# Patient Record
Sex: FEMALE | Race: WHITE | NOT HISPANIC OR LATINO | Employment: OTHER | URBAN - METROPOLITAN AREA
[De-identification: names, ages, dates, MRNs, and addresses within clinical notes are randomized per-mention and may not be internally consistent; named-entity substitution may affect disease eponyms.]

---

## 2017-01-24 ENCOUNTER — ALLSCRIPTS OFFICE VISIT (OUTPATIENT)
Dept: OTHER | Facility: OTHER | Age: 37
End: 2017-01-24

## 2017-01-24 ENCOUNTER — GENERIC CONVERSION - ENCOUNTER (OUTPATIENT)
Dept: OTHER | Facility: OTHER | Age: 37
End: 2017-01-24

## 2017-01-24 DIAGNOSIS — M54.9 DORSALGIA: ICD-10-CM

## 2017-01-24 DIAGNOSIS — R05.9 COUGH: ICD-10-CM

## 2017-01-24 DIAGNOSIS — R53.83 OTHER FATIGUE: ICD-10-CM

## 2017-01-24 DIAGNOSIS — R53.81 OTHER MALAISE: ICD-10-CM

## 2017-01-24 LAB
BILIRUB UR QL STRIP: NEGATIVE
CLARITY UR: NORMAL
COLOR UR: YELLOW
GLUCOSE (HISTORICAL): NORMAL
HGB UR QL STRIP.AUTO: NEGATIVE
KETONES UR STRIP-MCNC: NEGATIVE MG/DL
LEUKOCYTE ESTERASE UR QL STRIP: NEGATIVE
NITRITE UR QL STRIP: NEGATIVE
PH UR STRIP.AUTO: 6 [PH]
PROT UR STRIP-MCNC: NEGATIVE MG/DL
SP GR UR STRIP.AUTO: 1.01
UROBILINOGEN UR QL STRIP.AUTO: NORMAL

## 2017-01-25 LAB
CULTURE RESULT (HISTORICAL): NORMAL
MISCELLANEOUS LAB TEST RESULT (HISTORICAL): NO GROWTH

## 2017-01-26 ENCOUNTER — HOSPITAL ENCOUNTER (OUTPATIENT)
Dept: RADIOLOGY | Facility: CLINIC | Age: 37
Discharge: HOME | End: 2017-01-26
Payer: COMMERCIAL

## 2017-01-26 ENCOUNTER — GENERIC CONVERSION - ENCOUNTER (OUTPATIENT)
Dept: OTHER | Facility: OTHER | Age: 37
End: 2017-01-26

## 2017-01-26 DIAGNOSIS — R05.9 COUGH: ICD-10-CM

## 2017-01-26 PROCEDURE — 71020 HB CHEST X-RAY 2VW FRONTAL&LATL: CPT

## 2017-03-17 ENCOUNTER — ALLSCRIPTS OFFICE VISIT (OUTPATIENT)
Dept: OTHER | Facility: OTHER | Age: 37
End: 2017-03-17

## 2017-08-08 ENCOUNTER — GENERIC CONVERSION - ENCOUNTER (OUTPATIENT)
Dept: OTHER | Facility: OTHER | Age: 37
End: 2017-08-08

## 2017-08-08 ENCOUNTER — ALLSCRIPTS OFFICE VISIT (OUTPATIENT)
Dept: OTHER | Facility: OTHER | Age: 37
End: 2017-08-08

## 2017-08-08 LAB
BILIRUB UR QL STRIP: NORMAL
CLARITY UR: NORMAL
COLOR UR: YELLOW
GLUCOSE (HISTORICAL): NORMAL
HGB UR QL STRIP.AUTO: NORMAL
KETONES UR STRIP-MCNC: NORMAL MG/DL
LEUKOCYTE ESTERASE UR QL STRIP: NORMAL
NITRITE UR QL STRIP: NORMAL
PH UR STRIP.AUTO: 6 [PH]
PROT UR STRIP-MCNC: NORMAL MG/DL
SP GR UR STRIP.AUTO: 1.01
UROBILINOGEN UR QL STRIP.AUTO: NORMAL

## 2017-11-30 ENCOUNTER — GENERIC CONVERSION - ENCOUNTER (OUTPATIENT)
Dept: OTHER | Facility: OTHER | Age: 37
End: 2017-11-30

## 2017-11-30 ENCOUNTER — LAB REQUISITION (OUTPATIENT)
Dept: LAB | Facility: HOSPITAL | Age: 37
End: 2017-11-30
Payer: COMMERCIAL

## 2017-11-30 DIAGNOSIS — Z01.419 ENCOUNTER FOR GYNECOLOGICAL EXAMINATION WITHOUT ABNORMAL FINDING: ICD-10-CM

## 2017-11-30 DIAGNOSIS — B37.0 CANDIDAL STOMATITIS: ICD-10-CM

## 2017-11-30 PROCEDURE — G0145 SCR C/V CYTO,THINLAYER,RESCR: HCPCS | Performed by: FAMILY MEDICINE

## 2017-12-06 LAB
LAB AP GYN PRIMARY INTERPRETATION: NORMAL
Lab: NORMAL
PATH INTERP SPEC-IMP: NORMAL

## 2017-12-09 ENCOUNTER — GENERIC CONVERSION - ENCOUNTER (OUTPATIENT)
Dept: OTHER | Facility: OTHER | Age: 37
End: 2017-12-09

## 2018-01-11 NOTE — MISCELLANEOUS
Provider Comments  Provider Comments:   [facility] TO RESCHED Nataly Milford MISSED APPT WITH DR ORTIZ-BURTON      Signatures   Electronically signed by : Nisa Monet MD; Mar 17 2017  8:24PM EST                       (Author)

## 2018-01-13 VITALS
TEMPERATURE: 98.5 F | SYSTOLIC BLOOD PRESSURE: 106 MMHG | HEART RATE: 88 BPM | DIASTOLIC BLOOD PRESSURE: 80 MMHG | RESPIRATION RATE: 20 BRPM

## 2018-01-16 NOTE — RESULT NOTES
Message   PLEASE CALL   URINE CULTURE WAS OK   HOW IS SHE FEELING?      Verified Results  ([facility]) Urine Culture, Routine [id number] 12:00AM [facility] [place][note 3]     Test Name Result Flag Reference   Urine Culture, Routine Final report     Result 1 No growth

## 2018-01-22 VITALS
RESPIRATION RATE: 16 BRPM | TEMPERATURE: 98.5 F | DIASTOLIC BLOOD PRESSURE: 60 MMHG | HEART RATE: 72 BPM | SYSTOLIC BLOOD PRESSURE: 112 MMHG

## 2018-01-22 VITALS
RESPIRATION RATE: 18 BRPM | TEMPERATURE: 99.1 F | SYSTOLIC BLOOD PRESSURE: 122 MMHG | DIASTOLIC BLOOD PRESSURE: 80 MMHG | HEART RATE: 80 BPM

## 2018-01-23 NOTE — RESULT NOTES
Verified Results  (1) THIN PREP PAP WITH IMAGING [id number] 12:49PM Simón Cohn     Test Name Result Flag Reference   LAB AP CASE REPORT (Report)     Gynecologic Cytology Report            Case: [id number]                  Authorizing Provider: Asad Jiménez MD     Collected:      11/30/2017           First Screen:     FLORI Ledbetter    Received:      12/01/2017 0854        Specimen:  LIQUID-BASED PAP, SCREENING, Endocervical   LAB AP GYN PRIMARY INTERPRETATION      Negative for intraepithelial lesion or malignancy  Electronically signed by FLORI Ledbetter on 12/6/2017 at 12:49 PM   LAB AP GYN INTERPRETATION      Shift in precious suggestive of bacterial vaginosis   LAB AP GYN SPECIMEN ADEQUACY      Satisfactory for evaluation  Endocervical/transformation zone component present  LAB AP GYN ADDITIONAL INFORMATION (Report)     [facility] FDA approved ,  and ThinPrep Imaging System are   utilized with strict adherence to the 's instruction manual to   prepare gynecologic and non-gynecologic cytology specimens for the   production of ThinPrep slides as well as for gynecologic ThinPrep imaging  These processes have been validated by our laboratory and/or by the     The Pap test is not a diagnostic procedure and should not be used as the   sole means to detect cervical cancer  It is only a screening procedure to   aid in the detection of cervical cancer and its precursors  Both   false-negative and false-positive results have been experienced  Your   patient's test result should be interpreted in this context together with   the history and clinical findings     LAB AP LMP Source:  Endocervix     Source:  Endocervix

## 2018-02-05 ENCOUNTER — TELEPHONE (OUTPATIENT)
Dept: FAMILY MEDICINE CLINIC | Facility: CLINIC | Age: 38
End: 2018-02-05

## 2018-02-05 NOTE — TELEPHONE ENCOUNTER
She has been using her emergency inhaler a a lot more and has been getting thrush from it  Earl Farrell is wondering if we should go back to the old of the inhaler?     If so needs another rx sent to [facility]    States no med allergies

## 2018-02-06 DIAGNOSIS — J45.20 MILD INTERMITTENT ASTHMA WITHOUT COMPLICATION: Primary | ICD-10-CM

## 2018-02-06 PROBLEM — B37.0 THRUSH: Status: ACTIVE | Noted: 2017-11-09

## 2018-02-06 PROBLEM — A69.20 LYME DISEASE: Status: ACTIVE | Noted: 2017-08-08

## 2018-02-06 RX ORDER — BACLOFEN 10 MG/1
10 TABLET ORAL 3 TIMES DAILY PRN
COMMUNITY

## 2018-02-06 RX ORDER — MONTELUKAST SODIUM 10 MG/1
1 TABLET ORAL DAILY
COMMUNITY

## 2018-02-06 RX ORDER — ALBUTEROL SULFATE 90 UG/1
2 AEROSOL, METERED RESPIRATORY (INHALATION) 3 TIMES DAILY PRN
Qty: 1 INHALER | Refills: 3 | Status: SHIPPED | OUTPATIENT
Start: 2018-02-06

## 2018-02-06 RX ORDER — ALBUTEROL SULFATE 90 UG/1
2 AEROSOL, METERED RESPIRATORY (INHALATION) 3 TIMES DAILY PRN
COMMUNITY
Start: 2017-12-15 | End: 2018-02-06 | Stop reason: SDUPTHER

## 2018-02-06 RX ORDER — SERINE 100 %
CRYSTALS MISCELLANEOUS
COMMUNITY

## 2018-02-06 RX ORDER — UBIDECARENONE 100 MG
1 CAPSULE ORAL 3 TIMES DAILY
COMMUNITY

## 2018-02-06 RX ORDER — CLOTRIMAZOLE 10 MG/1
LOZENGE ORAL; TOPICAL 3 TIMES DAILY
COMMUNITY
End: 2018-05-29 | Stop reason: SDUPTHER

## 2018-02-07 DIAGNOSIS — J45.40 MODERATE PERSISTENT ASTHMA WITHOUT COMPLICATION: Primary | ICD-10-CM

## 2018-02-07 NOTE — TELEPHONE ENCOUNTER
PLEASE CALL BACK  YES I WOULD GO BACK ON THE ADVAIR AND WOULD USE THE ALBUTEROL IN ADDITION WHEN SHE FEELS SOB    THANKS

## 2018-02-07 NOTE — TELEPHONE ENCOUNTER
Wade Vale called back again, still waiting for a respone if you think Orlando Ebbs should go back to the old dose of advair    If so needs another rx  [facility] [place]

## 2018-02-09 ENCOUNTER — OFFICE VISIT (OUTPATIENT)
Dept: FAMILY MEDICINE CLINIC | Facility: CLINIC | Age: 38
End: 2018-02-09
Payer: COMMERCIAL

## 2018-02-09 VITALS
HEART RATE: 81 BPM | SYSTOLIC BLOOD PRESSURE: 100 MMHG | TEMPERATURE: 98.9 F | RESPIRATION RATE: 18 BRPM | OXYGEN SATURATION: 94 % | DIASTOLIC BLOOD PRESSURE: 80 MMHG

## 2018-02-09 DIAGNOSIS — R06.2 WHEEZING: ICD-10-CM

## 2018-02-09 DIAGNOSIS — R06.02 SHORTNESS OF BREATH: ICD-10-CM

## 2018-02-09 DIAGNOSIS — J45.40 MODERATE PERSISTENT ASTHMA WITHOUT COMPLICATION: Primary | ICD-10-CM

## 2018-02-09 DIAGNOSIS — J45.41 MODERATE PERSISTENT ASTHMA WITH EXACERBATION: Primary | ICD-10-CM

## 2018-02-09 PROCEDURE — 99213 OFFICE O/P EST LOW 20 MIN: CPT | Performed by: NURSE PRACTITIONER

## 2018-02-09 RX ORDER — ALBUTEROL SULFATE 2.5 MG/3ML
2.5 SOLUTION RESPIRATORY (INHALATION) EVERY 4 HOURS PRN
Qty: 75 ML | Refills: 2 | Status: SHIPPED | OUTPATIENT
Start: 2018-02-09 | End: 2019-03-27 | Stop reason: SDUPTHER

## 2018-02-09 NOTE — PATIENT INSTRUCTIONS
Fluids and Rest  Nebulizer four times daily  - if shakiness or rapid heart rate reduce to 2-3 times daily  Call if any issues or if symptoms persist or worsen  Call office on Monday with update on symptoms  - may need to try steroid course if symptoms do not improve next week

## 2018-02-09 NOTE — PROGRESS NOTES
Assessment/Plan:     Diagnoses and all orders for this visit:    Shortness of breath  Wheezing    Moderate persistent asthma with exacerbation  -     albuterol (2 5 mg/3 mL) 0 083 % nebulizer solution; Take 3 mL (2 5 mg total) by nebulization every 4 (four) hours as needed for shortness of breath  - Patient would like to avoid systemic corticosteroid use at this time; instructed to follow up early next week/call office with symptom update     Subjective:      Patient ID: Mayra Jolly is a 40 y o  female  Shortness of Breath   This is a new problem  The current episode started 1 to 4 weeks ago  The problem occurs daily  The problem has been gradually worsening  The average episode lasts 1 hour  Associated symptoms include headaches and sputum production  Pertinent negatives include no chest pain, ear pain, fever, orthopnea, PND, rhinorrhea, sore throat or wheezing  Risk factors include prolonged immobilization  She has tried steroid inhalers, leukotriene antagonists and beta agonist inhalers for the symptoms  The treatment provided mild relief  Her past medical history is significant for allergies and asthma  The following portions of the patient's history were reviewed and updated as appropriate: allergies, current medications, past family history, past medical history, past social history, past surgical history and problem list     Review of Systems   Constitutional: Negative for chills, fatigue and fever  HENT: Negative for ear pain, postnasal drip, rhinorrhea and sore throat  Respiratory: Positive for cough, sputum production, chest tightness and shortness of breath  Negative for wheezing and stridor  Cardiovascular: Negative for chest pain, orthopnea and PND  Gastrointestinal: Positive for constipation (intermittent)  Negative for diarrhea  Genitourinary: Negative for dysuria  Neurological: Positive for headaches           Objective:    Vitals:    02/09/18 1330   BP: 100/80 Pulse: 81   Resp: 18   Temp: 98 9 °F (37 2 °C)   SpO2: 94%        Physical Exam   Constitutional: She is oriented to person, place, and time  She appears well-developed and well-nourished  HENT:   Head: Normocephalic and atraumatic  Right Ear: External ear normal  No swelling or tenderness  No middle ear effusion  Left Ear: External ear normal  No swelling or tenderness  No middle ear effusion  Nose: No mucosal edema or rhinorrhea  Mouth/Throat: No posterior oropharyngeal edema or posterior oropharyngeal erythema  Eyes: Conjunctivae and EOM are normal  Pupils are equal, round, and reactive to light  Neck: Normal range of motion  Neck supple  No thyromegaly present  Cardiovascular: Normal rate, regular rhythm and normal heart sounds  Pulmonary/Chest: Effort normal  No respiratory distress  She has wheezes (bilateral posterior ) in the right upper field, the right lower field, the left upper field and the left lower field  She has no rhonchi  She has no rales  She exhibits no tenderness  Abdominal: Soft  Bowel sounds are normal  She exhibits no distension  There is no tenderness  Lymphadenopathy:     She has no cervical adenopathy  Neurological: She is alert and oriented to person, place, and time  Skin: Skin is warm and dry

## 2018-04-30 ENCOUNTER — TELEPHONE (OUTPATIENT)
Dept: FAMILY MEDICINE CLINIC | Facility: CLINIC | Age: 38
End: 2018-04-30

## 2018-04-30 DIAGNOSIS — R51.9 ACUTE NONINTRACTABLE HEADACHE, UNSPECIFIED HEADACHE TYPE: Primary | ICD-10-CM

## 2018-04-30 RX ORDER — SUMATRIPTAN 100 MG/1
100 TABLET, FILM COATED ORAL ONCE AS NEEDED
Qty: 10 TABLET | Refills: 1 | Status: SHIPPED | OUTPATIENT
Start: 2018-04-30 | End: 2018-07-19 | Stop reason: SDUPTHER

## 2018-04-30 NOTE — TELEPHONE ENCOUNTER
RODERICK CALLED AND SAID THAT TED HAS BEEN HAVING BAD MIGRAINES THIS WEEKEND, SHE GAVE HER SOME OF HER RIZATRIPTAN 10 MG WHICH HELPED, BUT SHE DOESN'T THINK THAT SHE SHOULD BE SHARING MEDS  CAN YOU CALL SOMETHING IN FOR HER TO [facility] [place] [address]

## 2018-05-01 ENCOUNTER — TELEPHONE (OUTPATIENT)
Dept: FAMILY MEDICINE CLINIC | Facility: CLINIC | Age: 38
End: 2018-05-01

## 2018-05-01 NOTE — TELEPHONE ENCOUNTER
Darren Henderson SWALLOW TABLETS SO RODERICK WANTED TO KNOW IF YOU COULD ORDER SOMETHING [address]  PLEASE ADVISE   Marcelina Galvez

## 2018-05-29 DIAGNOSIS — B37.2 YEAST DERMATITIS: Primary | ICD-10-CM

## 2018-05-29 DIAGNOSIS — J45.40 MODERATE PERSISTENT ASTHMA WITHOUT COMPLICATION: ICD-10-CM

## 2018-05-30 RX ORDER — CLOTRIMAZOLE 10 MG/1
10 LOZENGE ORAL; TOPICAL 3 TIMES DAILY
Qty: 70 TABLET | Refills: 1 | Status: SHIPPED | OUTPATIENT
Start: 2018-05-30 | End: 2019-12-27 | Stop reason: SDUPTHER

## 2018-07-19 ENCOUNTER — OFFICE VISIT (OUTPATIENT)
Dept: FAMILY MEDICINE CLINIC | Facility: CLINIC | Age: 38
End: 2018-07-19
Payer: COMMERCIAL

## 2018-07-19 ENCOUNTER — TELEPHONE (OUTPATIENT)
Dept: FAMILY MEDICINE CLINIC | Facility: CLINIC | Age: 38
End: 2018-07-19

## 2018-07-19 VITALS
SYSTOLIC BLOOD PRESSURE: 104 MMHG | RESPIRATION RATE: 16 BRPM | TEMPERATURE: 98.6 F | DIASTOLIC BLOOD PRESSURE: 58 MMHG | HEART RATE: 72 BPM

## 2018-07-19 DIAGNOSIS — R11.0 NAUSEA: ICD-10-CM

## 2018-07-19 DIAGNOSIS — N30.90 CYSTITIS: Primary | ICD-10-CM

## 2018-07-19 DIAGNOSIS — R51.9 ACUTE NONINTRACTABLE HEADACHE, UNSPECIFIED HEADACHE TYPE: ICD-10-CM

## 2018-07-19 DIAGNOSIS — J06.9 URTI (ACUTE UPPER RESPIRATORY INFECTION): ICD-10-CM

## 2018-07-19 DIAGNOSIS — N30.00 ACUTE CYSTITIS WITHOUT HEMATURIA: ICD-10-CM

## 2018-07-19 DIAGNOSIS — J02.9 PHARYNGITIS, UNSPECIFIED ETIOLOGY: Primary | ICD-10-CM

## 2018-07-19 DIAGNOSIS — G12.21 AMYOTROPHIC LATERAL SCLEROSIS (ALS) (HCC): ICD-10-CM

## 2018-07-19 PROBLEM — R06.02 SHORTNESS OF BREATH: Status: RESOLVED | Noted: 2018-02-09 | Resolved: 2018-07-19

## 2018-07-19 PROBLEM — R06.2 WHEEZING: Status: RESOLVED | Noted: 2018-02-09 | Resolved: 2018-07-19

## 2018-07-19 PROBLEM — J45.41 MODERATE PERSISTENT ASTHMA WITH EXACERBATION: Status: RESOLVED | Noted: 2018-02-09 | Resolved: 2018-07-19

## 2018-07-19 PROBLEM — B37.0 THRUSH: Status: RESOLVED | Noted: 2017-11-09 | Resolved: 2018-07-19

## 2018-07-19 LAB
SL AMB  POCT GLUCOSE, UA: NORMAL
SL AMB LEUKOCYTE ESTERASE,UA: 25
SL AMB POCT BILIRUBIN,UA: NORMAL
SL AMB POCT BLOOD,UA: NORMAL
SL AMB POCT CLARITY,UA: CLEAR
SL AMB POCT COLOR,UA: YELLOW
SL AMB POCT KETONES,UA: NORMAL
SL AMB POCT NITRITE,UA: NORMAL
SL AMB POCT PH,UA: 8
SL AMB POCT SPECIFIC GRAVITY,UA: 1.01
SL AMB POCT URINE PROTEIN: NORMAL
SL AMB POCT UROBILINOGEN: NORMAL

## 2018-07-19 PROCEDURE — 81003 URINALYSIS AUTO W/O SCOPE: CPT | Performed by: FAMILY MEDICINE

## 2018-07-19 PROCEDURE — 99213 OFFICE O/P EST LOW 20 MIN: CPT | Performed by: FAMILY MEDICINE

## 2018-07-19 RX ORDER — AMOXICILLIN AND CLAVULANATE POTASSIUM 600; 42.9 MG/5ML; MG/5ML
600 POWDER, FOR SUSPENSION ORAL 2 TIMES DAILY
Qty: 200 ML | Refills: 0 | Status: SHIPPED | OUTPATIENT
Start: 2018-07-19 | End: 2018-07-29

## 2018-07-19 RX ORDER — NEOMYCIN/POLYMYXIN B/PRAMOXINE 3.5-10K-1
CREAM (GRAM) TOPICAL
COMMUNITY

## 2018-07-19 RX ORDER — CHOLECALCIFEROL (VITAMIN D3) 125 MCG
CAPSULE ORAL
COMMUNITY

## 2018-07-19 RX ORDER — OMEGA-3S/DHA/EPA/FISH OIL 1000-1400
CAPSULE,DELAYED RELEASE (ENTERIC COATED) ORAL
COMMUNITY

## 2018-07-19 NOTE — TELEPHONE ENCOUNTER
2 issues- her ceftin is not covered by insurance and also they do not have it in stock  Please sebd new rx for something else

## 2018-07-19 NOTE — PROGRESS NOTES
Assessment/Plan:    Problem List Items Addressed This Visit        Digestive    Pharyngitis - Primary    Relevant Medications    cefuroxime (CEFTIN) 250 mg/5 mL suspension       Respiratory    URTI (acute upper respiratory infection)    Relevant Medications    cefuroxime (CEFTIN) 250 mg/5 mL suspension       Nervous and Auditory    Amyotrophic lateral sclerosis (ALS) (HCC)       Other    Nausea      Other Visit Diagnoses     Acute cystitis without hematuria        Relevant Medications    cefuroxime (CEFTIN) 250 mg/5 mL suspension    Other Relevant Orders    Urine culture          Patient Instructions   PLENTY OF FLUIDS  REST  TYLENOL  URINE CULTURE  RV IF SYMPTOMS PERSIST OR WORSEN      Return if symptoms worsen or fail to improve  Subjective:      Patient ID: Elizabeth Mcbride is a 40 y o  female  Chief Complaint   Patient presents with    Sore Throat       IN ADDITION  PATIENT HAS BEEN FEELING MILDLY NAUSEOUS  NO VOMITING  SL LOOSE STOOLS BUT THIS IS NOT UNUSUAL      Sore Throat    This is a new problem  The current episode started in the past 7 days  The problem has been gradually worsening  There has been no fever  The pain is moderate  Associated symptoms include congestion, diarrhea, ear pain, swollen glands and trouble swallowing  Pertinent negatives include no abdominal pain, coughing, drooling, ear discharge, headaches, hoarse voice, plugged ear sensation, neck pain, shortness of breath, stridor or vomiting  She has had no exposure to strep or mono  She has tried acetaminophen for the symptoms  The treatment provided mild relief  The following portions of the patient's history were reviewed and updated as appropriate: allergies, current medications, past family history, past medical history, past social history, past surgical history and problem list     Review of Systems   HENT: Positive for congestion, ear pain, sore throat and trouble swallowing   Negative for drooling, ear discharge and hoarse voice  Respiratory: Negative for cough, shortness of breath and stridor  Gastrointestinal: Positive for diarrhea  Negative for abdominal pain and vomiting  Musculoskeletal: Negative for neck pain  Neurological: Negative for headaches  Current Outpatient Prescriptions   Medication Sig Dispense Refill    albuterol (VENTOLIN HFA) 90 mcg/act inhaler Inhale 2 puffs 3 (three) times a day as needed (WHEEZING) 1 Inhaler 3    B COMPLEX VITAMINS SL Place under the tongue      B-12, Methylcobalamin, 1000 MCG SUBL Place under the tongue      baclofen 10 mg tablet Take 10 mg by mouth 3 (three) times a day as needed        Cetirizine HCl (ZYRTEC ALLERGY) 10 MG CAPS Take 1 tablet by mouth daily      co-enzyme Q-10 100 mg capsule Take 1 capsule by mouth 3 (three) times a day      dextromethorphan-quinidine (NUEDEXTA) 20-10 mg Take 1 capsule by mouth 2 (two) times a day      FIBER ADULT GUMMIES 2 g CHEW Chew      fluticasone-salmeterol (ADVAIR DISKUS) 100-50 mcg/dose Inhale 1 puff 2 (two) times a day 1 Inhaler 3    L-Arginine 500 MG CAPS Take by mouth      L-Serine POWD by Does not apply route      montelukast (SINGULAIR) 10 mg tablet Take 1 tablet by mouth daily      Multiple Vitamins-Minerals (MULTI-VITAMIN GUMMIES) CHEW Chew      albuterol (2 5 mg/3 mL) 0 083 % nebulizer solution Take 3 mL (2 5 mg total) by nebulization every 4 (four) hours as needed for shortness of breath 75 mL 2    cefuroxime (CEFTIN) 250 mg/5 mL suspension Take 5 mL (250 mg total) by mouth 2 (two) times a day for 10 days 100 mL 0    clotrimazole (MYCELEX) 10 mg danny Take 1 tablet (10 mg total) by mouth 3 (three) times a day 70 tablet 1    SUMAtriptan (IMITREX) 100 mg tablet Take 1 tablet (100 mg total) by mouth once as needed for migraine for up to 1 dose 10 tablet 1     No current facility-administered medications for this visit          Objective:    /58   Pulse 72   Temp 98 6 °F (37 °C) (Temporal)   Resp 16        Physical Exam   Constitutional: She is oriented to person, place, and time  She appears well-developed and well-nourished  HENT:   Head: Normocephalic and atraumatic  Right Ear: External ear and ear canal normal  A middle ear effusion is present  Left Ear: External ear and ear canal normal  A middle ear effusion is present  Nose: Mucosal edema and rhinorrhea present  Mouth/Throat: Uvula is midline  Posterior oropharyngeal erythema present  No oropharyngeal exudate  Eyes: Pupils are equal, round, and reactive to light  Right eye exhibits no discharge  Left eye exhibits no discharge  Neck: Normal range of motion  Neck supple  Cardiovascular: Normal rate, regular rhythm and normal heart sounds  No murmur heard  Pulmonary/Chest: Effort normal  No respiratory distress  She has no wheezes  She has no rales  Abdominal: Soft  Bowel sounds are normal  There is no tenderness  There is no rebound  Lymphadenopathy:     She has no cervical adenopathy  Neurological: She is alert and oriented to person, place, and time  Skin: Skin is warm and dry  No rash noted  Psychiatric: She has a normal mood and affect   Her behavior is normal  Judgment and thought content normal               Whitney Belle MD

## 2018-07-21 LAB
BACTERIA UR CULT: NORMAL
Lab: NO GROWTH

## 2018-07-22 RX ORDER — RIZATRIPTAN BENZOATE 10 MG/1
TABLET, ORALLY DISINTEGRATING ORAL
Qty: 12 TABLET | Refills: 1 | Status: SHIPPED | OUTPATIENT
Start: 2018-07-22 | End: 2018-10-04 | Stop reason: SDUPTHER

## 2018-07-23 ENCOUNTER — TELEPHONE (OUTPATIENT)
Dept: FAMILY MEDICINE CLINIC | Facility: CLINIC | Age: 38
End: 2018-07-23

## 2018-07-23 NOTE — TELEPHONE ENCOUNTER
Saw you on thur and gave med which she was feeling better with until Saturday afternoon  She felt very fatigued, st and headache came back    Please advise  [facility]

## 2018-07-24 NOTE — TELEPHONE ENCOUNTER
I WOULD CONTINUE MEDICATION  KEEP WELL HYDRATED  IF NOT IMPROVED E WILL NEED TO RE EVALUATE HER AT THE OFFICE    THANKS

## 2018-08-20 ENCOUNTER — TELEPHONE (OUTPATIENT)
Dept: FAMILY MEDICINE CLINIC | Facility: CLINIC | Age: 38
End: 2018-08-20

## 2018-08-20 ENCOUNTER — OFFICE VISIT (OUTPATIENT)
Dept: FAMILY MEDICINE CLINIC | Facility: CLINIC | Age: 38
End: 2018-08-20
Payer: COMMERCIAL

## 2018-08-20 VITALS
TEMPERATURE: 98.4 F | OXYGEN SATURATION: 98 % | RESPIRATION RATE: 16 BRPM | SYSTOLIC BLOOD PRESSURE: 92 MMHG | HEART RATE: 78 BPM | DIASTOLIC BLOOD PRESSURE: 60 MMHG

## 2018-08-20 DIAGNOSIS — R10.9 RIGHT FLANK PAIN: ICD-10-CM

## 2018-08-20 DIAGNOSIS — R19.7 DIARRHEA, UNSPECIFIED TYPE: ICD-10-CM

## 2018-08-20 DIAGNOSIS — N30.00 ACUTE CYSTITIS WITHOUT HEMATURIA: Primary | ICD-10-CM

## 2018-08-20 DIAGNOSIS — R53.83 FATIGUE, UNSPECIFIED TYPE: ICD-10-CM

## 2018-08-20 LAB
SL AMB  POCT GLUCOSE, UA: NORMAL
SL AMB LEUKOCYTE ESTERASE,UA: 75
SL AMB POCT BILIRUBIN,UA: NEGATIVE
SL AMB POCT BLOOD,UA: NEGATIVE
SL AMB POCT CLARITY,UA: ABNORMAL
SL AMB POCT COLOR,UA: YELLOW
SL AMB POCT KETONES,UA: NEGATIVE
SL AMB POCT NITRITE,UA: NEGATIVE
SL AMB POCT PH,UA: 7
SL AMB POCT SPECIFIC GRAVITY,UA: 1
SL AMB POCT URINE PROTEIN: NEGATIVE
SL AMB POCT UROBILINOGEN: NORMAL

## 2018-08-20 PROCEDURE — 99213 OFFICE O/P EST LOW 20 MIN: CPT | Performed by: NURSE PRACTITIONER

## 2018-08-20 PROCEDURE — 81003 URINALYSIS AUTO W/O SCOPE: CPT | Performed by: NURSE PRACTITIONER

## 2018-08-20 RX ORDER — SULFAMETHOXAZOLE AND TRIMETHOPRIM 800; 160 MG/1; MG/1
1 TABLET ORAL EVERY 12 HOURS SCHEDULED
Qty: 10 TABLET | Refills: 0 | Status: SHIPPED | OUTPATIENT
Start: 2018-08-20 | End: 2018-08-25

## 2018-08-20 NOTE — TELEPHONE ENCOUNTER
I placed the order in the chart, I forgot to place it during her visit and I apologize  If she would like to pick it up or we can send in the mail  It is up to her  Also, can you inform her that I sent over the rx for the antibiotic for her to start today  Thanks!

## 2018-08-20 NOTE — PATIENT INSTRUCTIONS
Increase hydration as discussed  Finish entire course of antibiotics even if you feel better  Complete blood work at [facility]  - call office when blood work is complete and will discuss in office

## 2018-08-20 NOTE — PROGRESS NOTES
Assessment/Plan:    Problem List Items Addressed This Visit    Visit Diagnoses     Acute cystitis without hematuria    -  Primary    Relevant Medications    sulfamethoxazole-trimethoprim (BACTRIM DS) 800-160 mg per tablet    Right flank pain        Relevant Orders    POCT urine dip auto non-scope    UA (URINE) with reflex to Microscopic and Urine Culture    CBC and differential    Comprehensive metabolic panel    TSH, 3rd generation with Free T4 reflex    PTH, intact    Fatigue, unspecified type        Relevant Orders    UA (URINE) with reflex to Microscopic and Urine Culture    CBC and differential    Comprehensive metabolic panel    TSH, 3rd generation with Free T4 reflex    PTH, intact    Diarrhea, unspecified type        Relevant Orders    CBC and differential    TSH, 3rd generation with Free T4 reflex    PTH, intact        Patient Instructions   Increase hydration as discussed  Finish entire course of antibiotics even if you feel better  Complete blood work at [facility]  - call office when blood work is complete and will discuss in office     Return in about 5 days (around 8/25/2018), or if symptoms worsen or fail to improve  Subjective:      Patient ID: Merlin Naylor is a 41 y o  female  Chief Complaint   Patient presents with    Back Pain     back/kidney pains bilaterally, more on the Left side- back pain x3 days     Felicia Henriquez is a 41year old female who presents to the office with c/o right sided back pain "in the kidney area" x's 3 days  Pt also c/o fatigue and "reactions to food" x's a few weeks and has been gradually worsening, symptoms include increased mucous production and periodic diarrhea  Pt does have a history of food allergies including strawberries and gluten  Denies fevers, n/v at this time  Mother reports significant family history of thyroid disorder (Grave's disease) and her, herself has had her parathyroid removed  No other acute or chronic complaints at this time   Pt does have ALS, wheelchair bound  The following portions of the patient's history were reviewed and updated as appropriate: allergies, current medications, past family history, past medical history, past social history, past surgical history and problem list     Review of Systems   Constitutional: Positive for fatigue  Negative for appetite change, chills, fever and unexpected weight change  HENT: Negative for sore throat  Eyes: Negative for photophobia, pain and visual disturbance  Respiratory: Positive for cough (increased mucous production)  Negative for chest tightness and shortness of breath  Cardiovascular: Negative for chest pain and leg swelling  Gastrointestinal: Positive for diarrhea  Negative for abdominal distention, abdominal pain (reports frequent heartburn), constipation, nausea and vomiting  Genitourinary: Positive for dysuria, flank pain (right flank) and frequency  Negative for difficulty urinating, hematuria and pelvic pain  Musculoskeletal: Positive for back pain (right sided back pain)  Neurological: Negative for dizziness and headaches         Current Outpatient Prescriptions   Medication Sig Dispense Refill    albuterol (2 5 mg/3 mL) 0 083 % nebulizer solution Take 3 mL (2 5 mg total) by nebulization every 4 (four) hours as needed for shortness of breath 75 mL 2    albuterol (VENTOLIN HFA) 90 mcg/act inhaler Inhale 2 puffs 3 (three) times a day as needed (WHEEZING) 1 Inhaler 3    B COMPLEX VITAMINS SL Place under the tongue      B-12, Methylcobalamin, 1000 MCG SUBL Place under the tongue      baclofen 10 mg tablet Take 10 mg by mouth 3 (three) times a day as needed        Cetirizine HCl (ZYRTEC ALLERGY) 10 MG CAPS Take 1 tablet by mouth daily      clotrimazole (MYCELEX) 10 mg danny Take 1 tablet (10 mg total) by mouth 3 (three) times a day 70 tablet 1    co-enzyme Q-10 100 mg capsule Take 1 capsule by mouth 3 (three) times a day      dextromethorphan-quinidine (NUEDEXTA) 20-10 mg Take 1 capsule by mouth 2 (two) times a day      FIBER ADULT GUMMIES 2 g CHEW Chew      fluticasone-salmeterol (ADVAIR DISKUS) 100-50 mcg/dose Inhale 1 puff 2 (two) times a day 1 Inhaler 3    L-Arginine 500 MG CAPS Take by mouth      L-Serine POWD by Does not apply route      montelukast (SINGULAIR) 10 mg tablet Take 1 tablet by mouth daily      Multiple Vitamins-Minerals (MULTI-VITAMIN GUMMIES) CHEW Chew      rizatriptan (MAXALT-MLT) 10 MG disintegrating tablet DISSOLVE 1 TABLET ON TONGUE AND SWALLOW ONCE AS NEEDED FOR MIGRAINE  MAY REPEAT IN 2 HOURS IF NEEDED  MAX OF 3 TABLETS PER DAY  12 tablet 1    sulfamethoxazole-trimethoprim (BACTRIM DS) 800-160 mg per tablet Take 1 tablet by mouth every 12 (twelve) hours for 5 days 10 tablet 0     No current facility-administered medications for this visit  Objective:    BP 92/60 (BP Location: Left arm, Patient Position: Sitting, Cuff Size: Standard)   Pulse 78   Temp 98 4 °F (36 9 °C) (Temporal)   Resp 16   SpO2 98%      Physical Exam   Constitutional: She is oriented to person, place, and time  She appears well-developed and well-nourished  No distress  HENT:   Head: Normocephalic and atraumatic  Eyes: Conjunctivae are normal  Right eye exhibits no discharge  Left eye exhibits no discharge  Neck: Trachea normal and normal range of motion  Neck supple  Carotid bruit is not present  No thyroid mass and no thyromegaly present  Cardiovascular: Normal rate, regular rhythm and normal heart sounds  Pulmonary/Chest: Effort normal and breath sounds normal  She has no decreased breath sounds  She has no wheezes  She has no rhonchi  She has no rales  Abdominal: Soft  Bowel sounds are normal  She exhibits no distension and no mass  There is tenderness (epigastric; RUQ upon deep palpation)  There is no rebound  Lymphadenopathy:     She has no cervical adenopathy  Neurological: She is alert and oriented to person, place, and time     Skin: Skin is warm and dry  No rash noted  She is not diaphoretic  Psychiatric: She has a normal mood and affect   Her behavior is normal  Judgment and thought content normal        WATSON Montes

## 2018-08-20 NOTE — TELEPHONE ENCOUNTER
Does not see a script for the rest of the bloodwork and wanted to add [place] illness to the script

## 2018-08-21 LAB
APPEARANCE UR: CLEAR
BACTERIA UR CULT: NORMAL
BACTERIA URNS QL MICRO: NORMAL
BILIRUB UR QL STRIP: NEGATIVE
COLOR UR: YELLOW
EPI CELLS #/AREA URNS HPF: NORMAL /HPF
GLUCOSE UR QL: NEGATIVE
HGB UR QL STRIP: NEGATIVE
KETONES UR QL STRIP: NEGATIVE
LEUKOCYTE ESTERASE UR QL STRIP: ABNORMAL
Lab: NO GROWTH
MICRO URNS: ABNORMAL
MUCOUS THREADS URNS QL MICRO: PRESENT
NITRITE UR QL STRIP: NEGATIVE
PH UR STRIP: 7 [PH] (ref 5–7.5)
PROT UR QL STRIP: NEGATIVE
RBC #/AREA URNS HPF: NORMAL /HPF
SP GR UR: 1.01 (ref 1–1.03)
UROBILINOGEN UR STRIP-ACNC: 0.2 EU/DL (ref 0.2–1)
WBC #/AREA URNS HPF: NORMAL /HPF

## 2018-10-04 DIAGNOSIS — R51.9 ACUTE NONINTRACTABLE HEADACHE, UNSPECIFIED HEADACHE TYPE: ICD-10-CM

## 2018-10-04 RX ORDER — RIZATRIPTAN BENZOATE 10 MG/1
TABLET, ORALLY DISINTEGRATING ORAL
Qty: 12 TABLET | Refills: 1 | Status: SHIPPED | OUTPATIENT
Start: 2018-10-04 | End: 2019-02-05 | Stop reason: SDUPTHER

## 2018-10-15 ENCOUNTER — TELEPHONE (OUTPATIENT)
Dept: FAMILY MEDICINE CLINIC | Facility: CLINIC | Age: 38
End: 2018-10-15

## 2018-10-15 NOTE — TELEPHONE ENCOUNTER
Called Kan Krishnamurthy to inform ready  Kan Krishnamurthy asked for rx to be faxed to [facility]    Done and faxed to [facility]

## 2018-10-20 DIAGNOSIS — J45.40 MODERATE PERSISTENT ASTHMA WITHOUT COMPLICATION: ICD-10-CM

## 2018-10-21 DIAGNOSIS — J45.40 MODERATE PERSISTENT ASTHMA WITHOUT COMPLICATION: ICD-10-CM

## 2018-10-29 ENCOUNTER — TELEPHONE (OUTPATIENT)
Dept: FAMILY MEDICINE CLINIC | Facility: CLINIC | Age: 38
End: 2018-10-29

## 2018-10-29 DIAGNOSIS — K59.00 CONSTIPATION, UNSPECIFIED CONSTIPATION TYPE: Primary | ICD-10-CM

## 2018-10-29 RX ORDER — LACTULOSE 20 G/30ML
20 SOLUTION ORAL DAILY
Qty: 473 ML | Refills: 3 | Status: SHIPPED | OUTPATIENT
Start: 2018-10-29 | End: 2019-01-24 | Stop reason: ALTCHOICE

## 2018-10-29 NOTE — TELEPHONE ENCOUNTER
Wanted to refill a prescription for Derefabianoa Left    Has not had this prescription refilled since 2016    Lactulose solution USP 10 g/15 ml for Bowel movement    [facility]    No new allergies

## 2018-11-05 ENCOUNTER — TELEPHONE (OUTPATIENT)
Dept: FAMILY MEDICINE CLINIC | Facility: CLINIC | Age: 38
End: 2018-11-05

## 2018-11-05 NOTE — TELEPHONE ENCOUNTER
Queen Topher had a peg tube put in Oct 25  She has been having pain at the site since then  She has been on an antibiotic for possible infection there  Yolanda Kearns advised mom to take her to the ed to have that checked  The md who put the peg tube in also advised mom to go to [facility] but mom did not think Farrah Centeno had the strength to make that trip so just went to the local er

## 2018-11-06 DIAGNOSIS — R51.9 ACUTE NONINTRACTABLE HEADACHE, UNSPECIFIED HEADACHE TYPE: ICD-10-CM

## 2018-11-06 RX ORDER — RIZATRIPTAN BENZOATE 10 MG/1
TABLET, ORALLY DISINTEGRATING ORAL
Qty: 12 TABLET | Refills: 1 | Status: SHIPPED | OUTPATIENT
Start: 2018-11-06 | End: 2019-01-24 | Stop reason: SDUPTHER

## 2018-11-26 ENCOUNTER — TELEPHONE (OUTPATIENT)
Dept: FAMILY MEDICINE CLINIC | Facility: CLINIC | Age: 38
End: 2018-11-26

## 2018-11-26 ENCOUNTER — OFFICE VISIT (OUTPATIENT)
Dept: FAMILY MEDICINE CLINIC | Facility: CLINIC | Age: 38
End: 2018-11-26
Payer: MEDICARE

## 2018-11-26 VITALS
OXYGEN SATURATION: 98 % | SYSTOLIC BLOOD PRESSURE: 98 MMHG | RESPIRATION RATE: 14 BRPM | TEMPERATURE: 99.5 F | HEART RATE: 86 BPM | DIASTOLIC BLOOD PRESSURE: 70 MMHG

## 2018-11-26 DIAGNOSIS — H01.014 ULCERATIVE BLEPHARITIS OF LEFT UPPER EYELID: Primary | ICD-10-CM

## 2018-11-26 DIAGNOSIS — K94.29 PAIN FROM GASTROSTOMY TUBE (HCC): ICD-10-CM

## 2018-11-26 DIAGNOSIS — G12.21 AMYOTROPHIC LATERAL SCLEROSIS (ALS) (HCC): ICD-10-CM

## 2018-11-26 PROBLEM — J02.9 PHARYNGITIS: Status: RESOLVED | Noted: 2018-07-19 | Resolved: 2018-11-26

## 2018-11-26 PROBLEM — A69.20 LYME DISEASE: Status: RESOLVED | Noted: 2017-08-08 | Resolved: 2018-11-26

## 2018-11-26 PROBLEM — J06.9 URTI (ACUTE UPPER RESPIRATORY INFECTION): Status: RESOLVED | Noted: 2018-07-19 | Resolved: 2018-11-26

## 2018-11-26 PROBLEM — R11.0 NAUSEA: Status: RESOLVED | Noted: 2018-07-19 | Resolved: 2018-11-26

## 2018-11-26 PROCEDURE — 99213 OFFICE O/P EST LOW 20 MIN: CPT | Performed by: FAMILY MEDICINE

## 2018-11-26 RX ORDER — TOBRAMYCIN AND DEXAMETHASONE 3; 1 MG/ML; MG/ML
2 SUSPENSION/ DROPS OPHTHALMIC 4 TIMES DAILY
Qty: 2.5 ML | Refills: 1 | Status: SHIPPED | OUTPATIENT
Start: 2018-11-26 | End: 2019-01-24 | Stop reason: ALTCHOICE

## 2018-11-26 RX ORDER — AMOXICILLIN AND CLAVULANATE POTASSIUM 600; 42.9 MG/5ML; MG/5ML
600 POWDER, FOR SUSPENSION ORAL 2 TIMES DAILY
Qty: 200 ML | Refills: 0 | Status: SHIPPED | OUTPATIENT
Start: 2018-11-26 | End: 2018-12-06

## 2018-11-26 RX ORDER — ACYCLOVIR 200 MG/5ML
200 SUSPENSION ORAL 3 TIMES DAILY
Qty: 150 ML | Refills: 0 | Status: SHIPPED | OUTPATIENT
Start: 2018-11-26 | End: 2019-01-24 | Stop reason: ALTCHOICE

## 2018-11-26 NOTE — PROGRESS NOTES
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    Relevant Medications    tobramycin-dexamethasone (TOBRADEX) ophthalmic suspension    amoxicillin-clavulanate (AUGMENTIN) 600-42 9 MG/5ML suspension    acyclovir (ZOVIRAX) 200 mg/5 mL oral suspension    Pain from gastrostomy tube (HCC)    Relevant Medications    tobramycin-dexamethasone (TOBRADEX) ophthalmic suspension    amoxicillin-clavulanate (AUGMENTIN) 600-42 9 MG/5ML suspension    acyclovir (ZOVIRAX) 200 mg/5 mL oral suspension          Patient Instructions   COOL COMPRESS TO EYE  MEDICATIONS AS DIRECTED  CALL Wednesday WITH UPDATE, SOONER PRN    WILL DISCUSS G TUBE PAIN WITH SURGEON      Return in about 1 week (around 12/3/2018), or if symptoms worsen or fail to improve  Subjective:      Patient ID: Sharry Paget is a 41 y o  female      Chief Complaint   Patient presents with    lt eye lid     red on Friday and Saturday became swollen    Feeding Tube Problem     was changed on 10/25/18 and has had 2 ED visits    Cold Like Symptoms       PATIENT COMES IN FOR SEVERAL ISSUES    L UPPER EYELID BECAME RED AND SWOLLEN SEVERAL DAYS AGO  INITIALLY FELT LIKE SOMETHING WAS IN HER EYE  SOME CLEAR DRAINAGE  DENIES ANY FEVER OR CHILLS  NO HISTORY OF TRAUMA  NOTES NO PHOTOPHOBIA    ALSO COMPLAINS OF RECURRENT G TUBE PAIN  NO DRAINAGE  MINIMAL REDNESS  RECENT CT SCAN LAST WEEK NOTED NO COLLECTION OR ABSCESS          The following portions of the patient's history were reviewed and updated as appropriate: allergies, current medications, past family history, past medical history, past social history, past surgical history and problem list     Review of Systems   Constitutional: Negative for fever  HENT: Negative for congestion and sore throat  Eyes: Positive for pain, discharge and redness  Negative for photophobia, itching and visual disturbance  Respiratory: Negative for chest tightness  Cardiovascular: Negative for chest pain and palpitations  Gastrointestinal: Positive for abdominal pain  Negative for diarrhea, nausea and vomiting  Musculoskeletal: Negative for arthralgias and joint swelling  Neurological: Negative for numbness  Psychiatric/Behavioral: The patient is not nervous/anxious            Current Outpatient Prescriptions   Medication Sig Dispense Refill    ADVAIR DISKUS 100-50 MCG/DOSE inhaler INHALE ONE PUFF BY MOUTH TWICE A DAY 60 each 3    albuterol (2 5 mg/3 mL) 0 083 % nebulizer solution Take 3 mL (2 5 mg total) by nebulization every 4 (four) hours as needed for shortness of breath 75 mL 2    albuterol (VENTOLIN HFA) 90 mcg/act inhaler Inhale 2 puffs 3 (three) times a day as needed (WHEEZING) 1 Inhaler 3    B COMPLEX VITAMINS SL Place under the tongue      B-12, METHYLCOBALAMIN, SL Inject as directed 2 times per week      baclofen 10 mg tablet Take 10 mg by mouth 3 (three) times a day as needed        Cetirizine HCl (ZYRTEC ALLERGY) 10 MG CAPS Take 1 tablet by mouth daily      co-enzyme Q-10 100 mg capsule Take 1 capsule by mouth 3 (three) times a day      dextromethorphan-quinidine (NUEDEXTA) 20-10 mg Take 1 capsule by mouth 2 (two) times a day      FIBER ADULT GUMMIES 2 g CHEW Chew      L-Arginine 500 MG CAPS Take by mouth      L-Serine POWD by Does not apply route      lactulose 20 g/30 mL Take 30 mL (20 g total) by mouth daily 473 mL 3    montelukast (SINGULAIR) 10 mg tablet Take 1 tablet by mouth daily      Multiple Vitamins-Minerals (MULTI-VITAMIN GUMMIES) CHEW Chew      Pantoprazole Sodium (PROTONIX PO) Take by mouth      rizatriptan (MAXALT-MLT) 10 MG disintegrating tablet DISSOLVE ONE TABLET ON TONGUE AND SWALLOW ONCE AS NEEDED FOR MIGRAINE  MAY REPEAT IN 2 HOURS IF NEEDED  MAX 3 TABLETS PER DAY 12 tablet 1    acyclovir (ZOVIRAX) 200 mg/5 mL oral suspension Take 5 mL (200 mg total) by mouth 3 (three) times a day for 10 days 150 mL 0    ADVAIR DISKUS 100-50 MCG/DOSE inhaler INHALE ONE PUFF BY MOUTH TWICE A DAY (Patient not taking: Reported on 11/26/2018) 60 each 3    amoxicillin-clavulanate (AUGMENTIN) 600-42 9 MG/5ML suspension Take 5 mL (600 mg total) by mouth 2 (two) times a day for 10 days BY G TUBE 200 mL 0    B-12, Methylcobalamin, 1000 MCG SUBL Place under the tongue      clotrimazole (MYCELEX) 10 mg danny Take 1 tablet (10 mg total) by mouth 3 (three) times a day (Patient not taking: Reported on 11/26/2018 ) 70 tablet 1    rizatriptan (MAXALT-MLT) 10 MG disintegrating tablet DISSOLVE ONE TABLET ON TONGUE AND SWALLOW ONCE AS NEEDED FOR MIGRAINE  MAY REPEAT IN 2 HOURS IF NEEDED  MAX 3 TABLETS PER DAY (Patient not taking: Reported on 11/26/2018) 12 tablet 1    tobramycin-dexamethasone (TOBRADEX) ophthalmic suspension Administer 2 drops into the left eye 4 (four) times a day for 5 days 2 5 mL 1     No current facility-administered medications for this visit  Objective:    BP 98/70 (BP Location: Right arm, Patient Position: Sitting, Cuff Size: Standard)   Pulse 86   Temp 99 5 °F (37 5 °C) (Temporal)   Resp 14   SpO2 98%        Physical Exam   Constitutional: She is oriented to person, place, and time  She appears well-developed and well-nourished  HENT:   Head: Normocephalic and atraumatic  Eyes: Pupils are equal, round, and reactive to light  EOM are normal  Right eye exhibits no discharge  Left eye exhibits no discharge  L UPPER EYELID - SWOLLEN, RED  TENDER TO PALPATION     Neck: Normal range of motion  Neck supple  No thyromegaly present  Cardiovascular: Normal rate, regular rhythm and normal heart sounds  No murmur heard    Pulmonary/Chest: Effort normal and breath sounds normal  No respiratory distress  She has no wheezes  She has no rales  Abdominal: Soft  Bowel sounds are normal  There is no tenderness  G TUBE IN PLACE  CLEAN  MINIMAL ERYTHEMA  NO DRAINAGE  ABD SOFT  BS PRESENT   NO TENDERNESS AT THIS TIME   Musculoskeletal: Normal range of motion  She exhibits no edema or tenderness  Lymphadenopathy:     She has no cervical adenopathy  Neurological: She is alert and oriented to person, place, and time  Skin: Skin is warm and dry  No rash noted  No erythema  Psychiatric: She has a normal mood and affect   Her behavior is normal  Judgment and thought content normal               Connie Alicia MD

## 2018-11-26 NOTE — PATIENT INSTRUCTIONS
COOL COMPRESS TO EYE  MEDICATIONS AS DIRECTED  CALL Wednesday WITH UPDATE, SOONER PRN    WILL DISCUSS G TUBE PAIN WITH SURGEON

## 2018-11-30 ENCOUNTER — TELEPHONE (OUTPATIENT)
Dept: FAMILY MEDICINE CLINIC | Facility: CLINIC | Age: 38
End: 2018-11-30

## 2018-11-30 NOTE — TELEPHONE ENCOUNTER
You wanted her to call in with an update on Tayler Garcia    Everything you prescribed worked very well  It is all cleared up    They will finished the course of antibiotics

## 2019-01-02 ENCOUNTER — TELEPHONE (OUTPATIENT)
Dept: FAMILY MEDICINE CLINIC | Facility: CLINIC | Age: 39
End: 2019-01-02

## 2019-01-02 NOTE — TELEPHONE ENCOUNTER
Aashish Must usually sees her surgeon in [facility] to have silver nitrate put on her feeding tube for tissue  Granulation    Aashish Must has an appt with you on 01/11/19, Rob Waller asked if that is something you would be comfortable doing for her so she doesn't have to go into the city/

## 2019-01-09 ENCOUNTER — TELEPHONE (OUTPATIENT)
Dept: FAMILY MEDICINE CLINIC | Facility: CLINIC | Age: 39
End: 2019-01-09

## 2019-01-09 ENCOUNTER — OFFICE VISIT (OUTPATIENT)
Dept: FAMILY MEDICINE CLINIC | Facility: CLINIC | Age: 39
End: 2019-01-09
Payer: MEDICARE

## 2019-01-09 VITALS
HEART RATE: 74 BPM | OXYGEN SATURATION: 98 % | DIASTOLIC BLOOD PRESSURE: 68 MMHG | SYSTOLIC BLOOD PRESSURE: 90 MMHG | TEMPERATURE: 98.1 F

## 2019-01-09 DIAGNOSIS — G12.21 ALS (AMYOTROPHIC LATERAL SCLEROSIS) (HCC): ICD-10-CM

## 2019-01-09 DIAGNOSIS — L92.9 GRANULATION TISSUE: Primary | ICD-10-CM

## 2019-01-09 PROCEDURE — 99213 OFFICE O/P EST LOW 20 MIN: CPT | Performed by: FAMILY MEDICINE

## 2019-01-09 NOTE — PROGRESS NOTES
Assessment/Plan:   Granulation tissue  The area with this treated with silver nitrate  Wound care reviewed  Call with any questions or concerns  Call immediately for change in symptomatology  Subjective:     Patient ID: Monalisa Armenta is a 41 y o  female  This is a 29-year-old female with a history of ALS and a PEG tube  There is some granulation tissue developing around the PEG tube  Review of Systems   Skin: Positive for rash  Objective:     Physical Exam   Constitutional: She appears well-developed and well-nourished  HENT:   Head: Normocephalic and atraumatic  Skin:        Granulation tissue present around the PEG tube

## 2019-01-24 ENCOUNTER — OFFICE VISIT (OUTPATIENT)
Dept: FAMILY MEDICINE CLINIC | Facility: CLINIC | Age: 39
End: 2019-01-24
Payer: MEDICARE

## 2019-01-24 VITALS — HEART RATE: 74 BPM | SYSTOLIC BLOOD PRESSURE: 98 MMHG | DIASTOLIC BLOOD PRESSURE: 60 MMHG

## 2019-01-24 DIAGNOSIS — G12.21 AMYOTROPHIC LATERAL SCLEROSIS (ALS) (HCC): ICD-10-CM

## 2019-01-24 DIAGNOSIS — K94.29 PAIN FROM GASTROSTOMY TUBE (HCC): ICD-10-CM

## 2019-01-24 DIAGNOSIS — H00.14 CHALAZION OF LEFT UPPER EYELID: Primary | ICD-10-CM

## 2019-01-24 PROCEDURE — 99213 OFFICE O/P EST LOW 20 MIN: CPT | Performed by: FAMILY MEDICINE

## 2019-01-24 RX ORDER — TOBRAMYCIN AND DEXAMETHASONE 3; 1 MG/ML; MG/ML
2 SUSPENSION/ DROPS OPHTHALMIC 4 TIMES DAILY
Qty: 5 ML | Refills: 1 | Status: SHIPPED | OUTPATIENT
Start: 2019-01-24 | End: 2019-02-08 | Stop reason: SDUPTHER

## 2019-01-24 NOTE — PROGRESS NOTES
Assessment/Plan:    Problem List Items Addressed This Visit        Nervous and Auditory    Amyotrophic lateral sclerosis (ALS) ([facility] [address] )       Musculoskeletal and Integument    Chalazion of left upper eyelid - Primary    Relevant Medications    tobramycin-dexamethasone (TOBRADEX) ophthalmic suspension       Other    Pain from gastrostomy tube (HCC)    Relevant Medications    silver sulfadiazine (SILVADENE,SSD) 1 % cream          Patient Instructions   KEEP AREA AROUND G TUBE CLEAN AND DRY  TRIAL OF SILVADENE CREAM    WARM COMPRESSES TO EYE  DROPS AS PRESCRIBED    RV 2-3 WEEKS FOR RE CHECK      Return in about 2 weeks (around 2/7/2019) for Recheck  Subjective:      Patient ID: Nicky Lucia is a 41 y o  female  Chief Complaint   Patient presents with    Pain     feeding tube    eye infection       PATIENT COMES IN  EYE SWELLING SEEMED TO GET BETTER  ONCE DROPS WERE DISCONTINUED  SYMPTOMS RETURNED    DENIES ANY FEVER OR CHILLS  NO VISUAL ISSUES    G TUBE STILL SORE AND SOME SL DRAINAGE          The following portions of the patient's history were reviewed and updated as appropriate: allergies, current medications, past family history, past medical history, past social history, past surgical history and problem list     Review of Systems   Constitutional: Positive for fatigue  Negative for activity change, appetite change, chills and fever  HENT: Positive for rhinorrhea  Negative for congestion and sore throat  Eyes: Positive for pain, discharge and redness  Respiratory: Negative for chest tightness  Cardiovascular: Negative for chest pain and palpitations  Gastrointestinal: Negative for abdominal pain, diarrhea, nausea and vomiting  Musculoskeletal: Negative for arthralgias and joint swelling  Skin: Positive for wound  Neurological: Negative for numbness  Psychiatric/Behavioral: The patient is not nervous/anxious            Current Outpatient Prescriptions   Medication Sig Dispense Refill    ADVAIR DISKUS 100-50 MCG/DOSE inhaler INHALE ONE PUFF BY MOUTH TWICE A DAY 60 each 3    albuterol (2 5 mg/3 mL) 0 083 % nebulizer solution Take 3 mL (2 5 mg total) by nebulization every 4 (four) hours as needed for shortness of breath 75 mL 2    albuterol (VENTOLIN HFA) 90 mcg/act inhaler Inhale 2 puffs 3 (three) times a day as needed (WHEEZING) 1 Inhaler 3    B COMPLEX VITAMINS SL Place under the tongue      B-12, Methylcobalamin, 1000 MCG SUBL Place under the tongue      baclofen 10 mg tablet Take 10 mg by mouth 3 (three) times a day as needed        Cetirizine HCl (ZYRTEC ALLERGY) 10 MG CAPS Take 1 tablet by mouth daily      clotrimazole (MYCELEX) 10 mg danny Take 1 tablet (10 mg total) by mouth 3 (three) times a day 70 tablet 1    co-enzyme Q-10 100 mg capsule Take 1 capsule by mouth 3 (three) times a day      dextromethorphan-quinidine (NUEDEXTA) 20-10 mg Take 1 capsule by mouth 2 (two) times a day      FIBER ADULT GUMMIES 2 g CHEW Chew      L-Arginine 500 MG CAPS Take by mouth      L-Serine POWD by Does not apply route      montelukast (SINGULAIR) 10 mg tablet Take 1 tablet by mouth daily      Multiple Vitamins-Minerals (MULTI-VITAMIN GUMMIES) CHEW Chew      rizatriptan (MAXALT-MLT) 10 MG disintegrating tablet DISSOLVE ONE TABLET ON TONGUE AND SWALLOW ONCE AS NEEDED FOR MIGRAINE  MAY REPEAT IN 2 HOURS IF NEEDED  MAX 3 TABLETS PER DAY 12 tablet 1    silver sulfadiazine (SILVADENE,SSD) 1 % cream Apply topically daily 50 g 0    tobramycin-dexamethasone (TOBRADEX) ophthalmic suspension Administer 2 drops into the left eye 4 (four) times a day for 14 days 5 mL 1     No current facility-administered medications for this visit  Objective:    BP 98/60 (BP Location: Right arm, Patient Position: Sitting, Cuff Size: Adult)   Pulse 74        Physical Exam   Constitutional: She is oriented to person, place, and time  She appears well-developed and well-nourished     HENT:   Head: Normocephalic and atraumatic  Eyes: Pupils are equal, round, and reactive to light  EOM are normal  Right eye exhibits no discharge  Left eye exhibits no discharge  L UPPER LID   ERYTHEMATOUS SWELLING   PROBABLE INFLAMED CHALAZION   Neck: Normal range of motion  Neck supple  No thyromegaly present  Cardiovascular: Normal rate, regular rhythm and normal heart sounds  No murmur heard  Pulmonary/Chest: Effort normal and breath sounds normal  No respiratory distress  She has no wheezes  She has no rales  Abdominal: Soft  Bowel sounds are normal  There is no tenderness  G TUBE SITE  SOME MILD IRRITATION AT INSERTION WITH SOME GRANULATION TISSUE    MILD CAUTERY WITH SILVER NITRATE WAS PERFORMED  DRESSED WITH SILVADENE   Musculoskeletal: Normal range of motion  She exhibits no edema or tenderness  Lymphadenopathy:     She has no cervical adenopathy  Neurological: She is alert and oriented to person, place, and time  Skin: Skin is warm and dry  No rash noted  No erythema  Psychiatric: She has a normal mood and affect   Her behavior is normal  Judgment and thought content normal               Maryjo Ortega MD

## 2019-01-24 NOTE — PATIENT INSTRUCTIONS
KEEP AREA AROUND G TUBE CLEAN AND DRY  TRIAL OF SILVADENE CREAM    WARM COMPRESSES TO EYE  DROPS AS PRESCRIBED    RV 2-3 WEEKS FOR RE CHECK

## 2019-02-05 DIAGNOSIS — R51.9 ACUTE NONINTRACTABLE HEADACHE, UNSPECIFIED HEADACHE TYPE: ICD-10-CM

## 2019-02-05 RX ORDER — RIZATRIPTAN BENZOATE 10 MG/1
10 TABLET, ORALLY DISINTEGRATING ORAL ONCE AS NEEDED
Qty: 12 TABLET | Refills: 1 | Status: SHIPPED | OUTPATIENT
Start: 2019-02-05 | End: 2019-04-21 | Stop reason: SDUPTHER

## 2019-02-08 ENCOUNTER — TELEPHONE (OUTPATIENT)
Dept: FAMILY MEDICINE CLINIC | Facility: CLINIC | Age: 39
End: 2019-02-08

## 2019-02-08 ENCOUNTER — OFFICE VISIT (OUTPATIENT)
Dept: FAMILY MEDICINE CLINIC | Facility: CLINIC | Age: 39
End: 2019-02-08
Payer: MEDICARE

## 2019-02-08 VITALS
TEMPERATURE: 97.5 F | SYSTOLIC BLOOD PRESSURE: 122 MMHG | OXYGEN SATURATION: 99 % | RESPIRATION RATE: 14 BRPM | DIASTOLIC BLOOD PRESSURE: 60 MMHG | HEART RATE: 84 BPM

## 2019-02-08 DIAGNOSIS — Z13.6 SCREENING FOR HYPERTENSION: ICD-10-CM

## 2019-02-08 DIAGNOSIS — N94.6 DYSMENORRHEA: ICD-10-CM

## 2019-02-08 DIAGNOSIS — Z13.220 SCREENING FOR HYPERLIPIDEMIA: ICD-10-CM

## 2019-02-08 DIAGNOSIS — Z11.3 SCREENING FOR STD (SEXUALLY TRANSMITTED DISEASE): ICD-10-CM

## 2019-02-08 DIAGNOSIS — H00.14 CHALAZION OF LEFT UPPER EYELID: ICD-10-CM

## 2019-02-08 DIAGNOSIS — Z13.29 SCREENING FOR HYPOTHYROIDISM: ICD-10-CM

## 2019-02-08 DIAGNOSIS — G12.21 AMYOTROPHIC LATERAL SCLEROSIS (ALS) (HCC): Primary | ICD-10-CM

## 2019-02-08 DIAGNOSIS — K94.29 PAIN FROM GASTROSTOMY TUBE (HCC): ICD-10-CM

## 2019-02-08 DIAGNOSIS — J45.20 MILD INTERMITTENT ASTHMA WITHOUT COMPLICATION: ICD-10-CM

## 2019-02-08 PROCEDURE — 99214 OFFICE O/P EST MOD 30 MIN: CPT | Performed by: FAMILY MEDICINE

## 2019-02-08 RX ORDER — LEVONORGESTREL AND ETHINYL ESTRADIOL 0.15-0.03
1 KIT ORAL DAILY
Qty: 91 TABLET | Refills: 1 | Status: SHIPPED | OUTPATIENT
Start: 2019-02-08 | End: 2019-02-10 | Stop reason: ALTCHOICE

## 2019-02-08 RX ORDER — TOBRAMYCIN AND DEXAMETHASONE 3; 1 MG/ML; MG/ML
2 SUSPENSION/ DROPS OPHTHALMIC 4 TIMES DAILY
Qty: 5 ML | Refills: 1 | Status: SHIPPED | OUTPATIENT
Start: 2019-02-08 | End: 2019-02-22

## 2019-02-08 NOTE — TELEPHONE ENCOUNTER
Seasonel doesn't seem to be available  They have seaonique or low seasonique  Please advise    thanks

## 2019-02-08 NOTE — PROGRESS NOTES
Assessment and Plan:    Problem List Items Addressed This Visit     None        Health Maintenance Due   Topic Date Due    Medicare Annual Wellness Visit (AWV)  1980         HPI:  Luciana Francis is a 41 y o  female here for herawv    Patient Active Problem List   Diagnosis    Amyotrophic lateral sclerosis (ALS) ([facility] [address] )    Mild intermittent asthma without complication    Ulcerative blepharitis of left upper eyelid    Pain from gastrostomy tube ([address] )    Chalazion of left upper eyelid     Past Medical History:   Diagnosis Date    Anal fissure     Asthma     Candidiasis, mouth      Past Surgical History:   Procedure Laterality Date    FEEDING TUBE PLACEMENT  10/25/2018     Family History   Problem Relation Age of Onset    Heart attack Mother     Cancer Maternal Grandfather         mouth    Colon cancer Maternal Grandfather     Emphysema Maternal Grandfather     Thyroid disease Sister     Hypertension Paternal Grandmother     Thyroid disease Paternal Grandmother     Emphysema Paternal Grandfather     Hypertension Maternal Aunt     Thyroid disease Other     Substance Abuse Neg Hx     Mental illness Neg Hx      History   Smoking Status    Former Smoker   Smokeless Tobacco    Never Used     History   Alcohol Use    Yes     Comment: minimum      History   Drug Use No       Current Outpatient Prescriptions   Medication Sig Dispense Refill    ADVAIR DISKUS 100-50 MCG/DOSE inhaler INHALE ONE PUFF BY MOUTH TWICE A DAY 60 each 3    albuterol (2 5 mg/3 mL) 0 083 % nebulizer solution Take 3 mL (2 5 mg total) by nebulization every 4 (four) hours as needed for shortness of breath 75 mL 2    albuterol (VENTOLIN HFA) 90 mcg/act inhaler Inhale 2 puffs 3 (three) times a day as needed (WHEEZING) 1 Inhaler 3    B COMPLEX VITAMINS SL Place under the tongue      B-12, Methylcobalamin, 1000 MCG SUBL Place under the tongue      baclofen 10 mg tablet Take 10 mg by mouth 3 (three) times a day as needed  Cetirizine HCl (ZYRTEC ALLERGY) 10 MG CAPS Take 1 tablet by mouth daily      clotrimazole (MYCELEX) 10 mg danny Take 1 tablet (10 mg total) by mouth 3 (three) times a day 70 tablet 1    co-enzyme Q-10 100 mg capsule Take 1 capsule by mouth 3 (three) times a day      dextromethorphan-quinidine (NUEDEXTA) 20-10 mg Take 1 capsule by mouth 2 (two) times a day      FIBER ADULT GUMMIES 2 g CHEW Chew      L-Arginine 500 MG CAPS Take by mouth      L-Serine POWD by Does not apply route      montelukast (SINGULAIR) 10 mg tablet Take 1 tablet by mouth daily      Multiple Vitamins-Minerals (MULTI-VITAMIN GUMMIES) CHEW Chew      rizatriptan (MAXALT-MLT) 10 MG disintegrating tablet Take 1 tablet (10 mg total) by mouth once as needed for migraine for up to 1 dose May repeat in 2 hours if needed 12 tablet 1    silver nitrate-potassium nitrate (ARZOL SILVER NITRATE) 92-39 % applicator Apply 1 applicator topically 3 (three) times a week      silver sulfadiazine (SILVADENE,SSD) 1 % cream Apply topically daily 50 g 0    tobramycin-dexamethasone (TOBRADEX) ophthalmic suspension Administer 2 drops into the left eye 4 (four) times a day for 14 days 5 mL 1     No current facility-administered medications for this visit  Allergies   Allergen Reactions    Morphine        There is no immunization history on file for this patient  Patient Care Team:  Nasrin Sanchez MD as PCP - General    Medicare Screening Tests and Risk Assessments:  Ravinder Martinez is here for her Subsequent Wellness visit  Health Risk Assessment:  Patient rates overall health as fair  Patient feels that their physical health rating is Same  Eyesight was rated as Slightly worse  Hearing was rated as Same  Patient feels that their emotional and mental health rating is Much better  Pain experienced by patient in the last 7 days has been A lot  Patient's pain rating has been 7/10  Emotional/Mental Health:  Patient has been feeling nervous/anxious  PHQ-9 Depression Screening:    Frequency of the following problems over the past two weeks:      1  Little interest or pleasure in doing things: 0 - not at all      2  Feeling down, depressed, or hopeless: 0 - not at all  PHQ-2 Score: 0          Broken Bones/Falls: Fall Risk Assessment:    In the past year, patient has experienced: No history of falling in past year          Bladder/Bowel:  Patient has not leaked urine accidently in the last six months  Patient reports no loss of bowel control  Immunizations:  Patient has not had a flu vaccination within the last year  Patient has not received a pneumonia shot  Patient has not received a shingles shot  Home Safety:  Patient has trouble with stairs inside or outside of their home  Patient currently reports that there are safety hazards present in home , working smoke alarms, working carbon monoxide detectors  Preventative Screenings:   No breast cancer screening performed, no colon cancer screen completed, no cholesterol screen completed, no glaucoma eye exam completed    Nutrition:  Current diet: Limited junk food with servings of the following:  (Additional Comments: High calorie  High protein)    Medications:  Patient is currently taking over-the-counter supplements  Patient is able to manage medications  Lifestyle Choices:  Patient reports no tobacco use  Patient has smoked or used tobacco in the past   Patient has stopped her tobacco use  Patient reports no alcohol use  Patient does not drive a vehicle  Patient wears seat belt  Current level of exercise of physical activity described by patient as: PT and "wriggling"          Activities of Daily Living:  Unable to get out of bed by his or her self, unable to dress self, unable to make own meals, unable to do own shopping, unable to bathe self, unable to do laundry/housekeeping, can manage own money, pay bills and track expensesAdditional Comments: Shopping only online    Previous Hospitalizations:  Hospitalization or ED visit in past 12 months  Number of hospitalizations within the last year: 3-4  Additional Comments: Feeding tube surgery,  ER visits    Advanced Directives:  Patient has decided on a power of   Patient has spoken to designated power of   Patient has completed advanced directive  Social Support: Brendon Padilla  [address] [address]    Preventative Screening/Counseling:      Cardiovascular:      General: Screening Current and Risks and Benefits Discussed      Counseling: Healthy Diet and Healthy Weight          Diabetes:      General: Screening Current      Counseling: Healthy Diet and Healthy Weight          Colorectal Cancer:      General: Screening Not Indicated          Breast Cancer:      General: Screening Not Indicated          Cervical Cancer:      General: Screening Current          Osteoporosis:      General: Screening Not Indicated          AAA:      General: Screening Not Indicated          Glaucoma:      General: Risks and Benefits Discussed          HIV:      General: Screening Current          Hepatitis C:      General: Risks and Benefits Discussed        Advanced Directives:   Patient has living will for healthcare, patient has an advanced directive  Provider agrees with end of life decisions

## 2019-02-10 DIAGNOSIS — N94.6 DYSMENORRHEA: Primary | ICD-10-CM

## 2019-02-10 RX ORDER — LEVONORGESTREL / ETHINYL ESTRADIOL AND ETHINYL ESTRADIOL 150-30(84)
1 KIT ORAL DAILY
Qty: 91 EACH | Refills: 1 | Status: SHIPPED | OUTPATIENT
Start: 2019-02-10 | End: 2019-02-14

## 2019-02-10 NOTE — PATIENT INSTRUCTIONS
DISCUSSED HEALTH ISSUES  HEALTHY DIET AND EXERCISE  BW WILL BE OBTAINED  RECOMMEND CALCIUM 4587-5229 MG DAILY  VITAMIN D3  1000 IU DAILY  RV IN 1 YEAR FOR ANNUAL EXAM, SOONER IF NEEDED

## 2019-02-10 NOTE — PROGRESS NOTES
Assessment/Plan:    Problem List Items Addressed This Visit        Respiratory    Mild intermittent asthma without complication     STABLE  COMPLIANT WITH MEDICATIONS  NO WHEEZING            Nervous and Auditory    Amyotrophic lateral sclerosis (ALS) (HCC) - Primary     STABLE  MARKED WEAKNESS  G TUBE DEPENDENT         Relevant Orders    TSH, 3rd generation    CBC and differential    Comprehensive metabolic panel    Lipid panel    UA (URINE) with reflex to Microscopic       Musculoskeletal and Integument    Chalazion of left upper eyelid     PERSISTENT SWELLING  NO DRAINAGE  SL TENDER           Relevant Medications    tobramycin-dexamethasone (TOBRADEX) ophthalmic suspension       Other    Pain from gastrostomy tube (HCC)    Relevant Orders    CBC and differential    Screening for STD (sexually transmitted disease)    Relevant Orders    RPR    HIV 1/2 AG-AB combo    Chlamydia/GC amplified DNA by PCR    Screening for hypothyroidism    Relevant Orders    TSH, 3rd generation    Lipid panel    Screening for hyperlipidemia    Relevant Orders    TSH, 3rd generation    Lipid panel    Screening for hypertension    Relevant Orders    Comprehensive metabolic panel    Lipid panel    UA (URINE) with reflex to Microscopic      Other Visit Diagnoses     Dysmenorrhea        Relevant Medications    levonorgestrel-ethinyl estradiol (SEASONALE) 0 15-0 03 MG per tablet          BMI Counseling: There is no height or weight on file to calculate BMI  Discussed the patient's BMI with her  The BMI is in the acceptable range  Patient Instructions   DISCUSSED HEALTH ISSUES  HEALTHY DIET AND EXERCISE  BW WILL BE OBTAINED  RECOMMEND CALCIUM 2852-1330 MG DAILY  VITAMIN D3  1000 IU DAILY  RV IN 1 YEAR FOR ANNUAL EXAM, SOONER IF NEEDED        No Follow-up on file  Subjective:      Patient ID: Amy Dhillon is a 41 y o  female      Chief Complaint   Patient presents with    Medicare Wellness Visit    Follow-up     left upper eyelid       PATIENT RETURNS FOR ANNUAL WELLNESS EXAM AND ANNUAL EVALUATION OF PATIENT'S MEDICAL ISSUES    INDIVIDUAL MEDICAL ISSUES WITH THEIR CURRENT STATUS, ASSESSMENT AND PLANS ARE LISTED ABOVE          The following portions of the patient's history were reviewed and updated as appropriate: allergies, current medications, past family history, past medical history, past social history, past surgical history and problem list     Review of Systems   Constitutional: Negative for chills, fatigue and fever  HENT: Negative for congestion, ear discharge, ear pain, mouth sores, postnasal drip, sore throat and trouble swallowing  Eyes: Negative for pain, discharge and visual disturbance  Respiratory: Negative for cough, shortness of breath and wheezing  Cardiovascular: Negative for chest pain, palpitations and leg swelling  Gastrointestinal: Negative for abdominal distention, abdominal pain, blood in stool, diarrhea and nausea  Endocrine: Negative for polydipsia, polyphagia and polyuria  Genitourinary: Negative for dysuria, frequency, hematuria and urgency  Musculoskeletal: Negative for arthralgias, gait problem and joint swelling  Skin: Negative for pallor and rash  Neurological: Positive for weakness and headaches  Negative for dizziness, syncope, speech difficulty, light-headedness and numbness  Hematological: Negative for adenopathy  Psychiatric/Behavioral: Negative for behavioral problems, confusion and sleep disturbance  The patient is not nervous/anxious            Current Outpatient Prescriptions   Medication Sig Dispense Refill    ADVAIR DISKUS 100-50 MCG/DOSE inhaler INHALE ONE PUFF BY MOUTH TWICE A DAY 60 each 3    albuterol (2 5 mg/3 mL) 0 083 % nebulizer solution Take 3 mL (2 5 mg total) by nebulization every 4 (four) hours as needed for shortness of breath 75 mL 2    albuterol (VENTOLIN HFA) 90 mcg/act inhaler Inhale 2 puffs 3 (three) times a day as needed (WHEEZING) 1 Inhaler 3  B COMPLEX VITAMINS SL Place under the tongue      B-12, Methylcobalamin, 1000 MCG SUBL Place under the tongue      baclofen 10 mg tablet Take 10 mg by mouth 3 (three) times a day as needed        Cetirizine HCl (ZYRTEC ALLERGY) 10 MG CAPS Take 1 tablet by mouth daily      clotrimazole (MYCELEX) 10 mg danny Take 1 tablet (10 mg total) by mouth 3 (three) times a day 70 tablet 1    co-enzyme Q-10 100 mg capsule Take 1 capsule by mouth 3 (three) times a day      dextromethorphan-quinidine (NUEDEXTA) 20-10 mg Take 1 capsule by mouth 2 (two) times a day      FIBER ADULT GUMMIES 2 g CHEW Chew      L-Arginine 500 MG CAPS Take by mouth      L-Serine POWD by Does not apply route      montelukast (SINGULAIR) 10 mg tablet Take 1 tablet by mouth daily      Multiple Vitamins-Minerals (MULTI-VITAMIN GUMMIES) CHEW Chew      rizatriptan (MAXALT-MLT) 10 MG disintegrating tablet Take 1 tablet (10 mg total) by mouth once as needed for migraine for up to 1 dose May repeat in 2 hours if needed 12 tablet 1    silver nitrate-potassium nitrate (ARZOL SILVER NITRATE) 77-25 % applicator Apply 1 applicator topically 3 (three) times a week      silver sulfadiazine (SILVADENE,SSD) 1 % cream Apply topically daily 50 g 0    levonorgestrel-ethinyl estradiol (SEASONALE) 0 15-0 03 MG per tablet Take 1 tablet by mouth daily 91 tablet 1    tobramycin-dexamethasone (TOBRADEX) ophthalmic suspension Administer 2 drops into the left eye 4 (four) times a day for 14 days 5 mL 1     No current facility-administered medications for this visit  Objective:    /60   Pulse 84   Temp 97 5 °F (36 4 °C) (Temporal)   Resp 14   SpO2 99%        Physical Exam   Constitutional: She is oriented to person, place, and time  She appears well-developed and well-nourished  HENT:   Head: Normocephalic and atraumatic  Eyes: Pupils are equal, round, and reactive to light  Conjunctivae and EOM are normal  Right eye exhibits no discharge  Left eye exhibits no discharge  Neck: Normal range of motion  Neck supple  No thyromegaly present  Cardiovascular: Normal rate, regular rhythm and normal heart sounds  No murmur heard  Pulmonary/Chest: Effort normal and breath sounds normal  No respiratory distress  She has no wheezes  She has no rales  Abdominal: Soft  Bowel sounds are normal  There is no tenderness  Musculoskeletal: Normal range of motion  She exhibits no edema or tenderness  Lymphadenopathy:     She has no cervical adenopathy  Neurological: She is alert and oriented to person, place, and time  GENERALIZED WEAKNESS  MODERATE MUSCLE ATROPHY   Skin: Skin is warm and dry  No rash noted  No erythema  Psychiatric: She has a normal mood and affect   Her behavior is normal  Judgment and thought content normal               Yahaira Varela MD

## 2019-02-13 ENCOUNTER — TELEPHONE (OUTPATIENT)
Dept: FAMILY MEDICINE CLINIC | Facility: CLINIC | Age: 39
End: 2019-02-13

## 2019-02-13 NOTE — TELEPHONE ENCOUNTER
Levonorgest-Eth Estrad 91-Day 0 15-0 03 &0 01 MG TABS was rejected by [employer]    Can you prescribe something else

## 2019-02-14 DIAGNOSIS — N94.6 DYSMENORRHEA: Primary | ICD-10-CM

## 2019-02-14 RX ORDER — NORETHINDRONE ACETATE AND ETHINYL ESTRADIOL 1MG-20(21)
1 KIT ORAL DAILY
Qty: 84 TABLET | Refills: 1 | Status: SHIPPED | OUTPATIENT
Start: 2019-02-14 | End: 2019-07-11 | Stop reason: SDUPTHER

## 2019-02-22 ENCOUNTER — TELEPHONE (OUTPATIENT)
Dept: FAMILY MEDICINE CLINIC | Facility: CLINIC | Age: 39
End: 2019-02-22

## 2019-02-22 NOTE — TELEPHONE ENCOUNTER
Pts mother is asking When can she start taking the Birth Control? Can she just randomly start any time? And what do they do with the placebo pills at the end of each pack? Her LMP was the beginning of February       Her mother states that this is to completely stop her periods

## 2019-02-23 NOTE — TELEPHONE ENCOUNTER
I THINK I WOULD START THE Sunday AFTER HER NEXT PERIOD  I WOULD GO THROUGH 3 CYCLES WITHOUT THE PLACEBO PILLS AND TAKE THEM AFTER THE 4 TH CYCLE    THANKS

## 2019-02-28 ENCOUNTER — TELEPHONE (OUTPATIENT)
Dept: FAMILY MEDICINE CLINIC | Facility: CLINIC | Age: 39
End: 2019-02-28

## 2019-02-28 NOTE — TELEPHONE ENCOUNTER
Renata Fernandes want to let you know she is Discharging pt from her services  She is stable of Tube feeding through her Peg Tube

## 2019-03-20 ENCOUNTER — TELEPHONE (OUTPATIENT)
Dept: FAMILY MEDICINE CLINIC | Facility: CLINIC | Age: 39
End: 2019-03-20

## 2019-03-20 NOTE — TELEPHONE ENCOUNTER
Family is requesting that Gabriela Jordan have 8 hours a day 7 days a week skilled nursing services  He needs a note from you stating the medical necessity for a skilled nurse  Needs to include her current diagnosis, past history, state in the letter that the family is requesting private duty nursing  Has to be on an 8 x 10 letterhead      Please fax to [phone] [place]  Fax # [phone]    Cell:    Any questions  [phone]    Please call Adolfo when completed

## 2019-03-21 NOTE — TELEPHONE ENCOUNTER
[address] [place] [address]    Wanted to know if they can start private nurse 8 hours a day, 7 days a week    [phone]

## 2019-03-22 NOTE — TELEPHONE ENCOUNTER
Mallory Naranjo called to check on the letter    They need the letter to send to the insurance company for the final approval

## 2019-03-22 NOTE — TELEPHONE ENCOUNTER
Yo Davidson called again - she is going to send a letter already done, all you have to do is sign it

## 2019-03-27 DIAGNOSIS — J45.41 MODERATE PERSISTENT ASTHMA WITH EXACERBATION: ICD-10-CM

## 2019-03-27 RX ORDER — ALBUTEROL SULFATE 2.5 MG/3ML
2.5 SOLUTION RESPIRATORY (INHALATION) EVERY 4 HOURS PRN
Qty: 75 ML | Refills: 2 | Status: SHIPPED | OUTPATIENT
Start: 2019-03-27

## 2019-03-28 ENCOUNTER — TELEPHONE (OUTPATIENT)
Dept: FAMILY MEDICINE CLINIC | Facility: CLINIC | Age: 39
End: 2019-03-28

## 2019-03-28 NOTE — TELEPHONE ENCOUNTER
Natasha Cano states the visiting nurse was over last night  She said Marshall Vincent has developed a cough every time she breathes  The nurse states there is no crackling in her chest or fluid, but Natasha Cano is concerned  I asked if she could bring Marshall Vincent in today to be checked but Natasha Cano states she is having a hard time getting her up and out today  Natasha Cano is planning on calling the visiting nurse today as well, but not sure if you think the cough is related to her ALS

## 2019-03-28 NOTE — TELEPHONE ENCOUNTER
Spoke to Colette  She did agree if Soundra Counter is not doing well to take her to the ER  She also agreed to keep us posted  Please advise the name of a pulmonologist that you recommend in the area    Thanks

## 2019-03-28 NOTE — TELEPHONE ENCOUNTER
Charmaine Record name and phone number [phone] per Dr Tico Mcmahon    No further action required at this time

## 2019-03-28 NOTE — TELEPHONE ENCOUNTER
UNFORTUNATELY I DO NOT DO ACUTE HOUSE CALLS DUE TO THE SCHEDULE  IF SHE THINKS SHE IS SICK ENOUGH TO GO TO THE ER SHE PROBABLY SHOULD  SORRY

## 2019-03-28 NOTE — TELEPHONE ENCOUNTER
Janneth Pacheco called back  She said she spoke to [facility]   He recommended getting a recommendation from you for her to see a local pulmonologist   Please provide a name and Janneth Pacheco will check if they participate with her insurance

## 2019-03-28 NOTE — TELEPHONE ENCOUNTER
America Hernandez from [facility] called  She states Jason Galvin is not coughing anything up but having shallow breathing because of the cough  She is overusing her nebulizer because of the cough  Also she is taking sudafed as needed which is not on her med list   America Hernandez advised Janneth Pacheco to take her to the ER if she worsens, but states Janneth Pacheco is very hesitant on that  America Hernandez asked if you would do a house call on her?

## 2019-03-29 ENCOUNTER — TELEPHONE (OUTPATIENT)
Dept: FAMILY MEDICINE CLINIC | Facility: CLINIC | Age: 39
End: 2019-03-29

## 2019-03-29 NOTE — TELEPHONE ENCOUNTER
Informed Memory Jairo of Dr La Mcclain message    She called the hospital and had it switched to azithromycin liquid

## 2019-03-29 NOTE — TELEPHONE ENCOUNTER
Wanted to let you know they took [place] to the [facility] ER last night  Diagnosis is acute bronchitis  3 prescriptions that she is picking up    cefuroxime axetil  500 mg  Tablets - They have to crush  Dr in the ER said ok to crush, but bottle say no crush    Prednisone - 15 mg/5ml  Give 13 3 ml by mouth daily     ipratropium bromide   5 mg along Albuterol 3 mg

## 2019-04-05 ENCOUNTER — TELEPHONE (OUTPATIENT)
Dept: FAMILY MEDICINE CLINIC | Facility: CLINIC | Age: 39
End: 2019-04-05

## 2019-04-05 DIAGNOSIS — J40 BRONCHITIS: Primary | ICD-10-CM

## 2019-04-05 RX ORDER — PREDNISOLONE SODIUM PHOSPHATE 15 MG/5ML
15 SOLUTION ORAL DAILY
Qty: 25 ML | Refills: 0 | Status: SHIPPED | OUTPATIENT
Start: 2019-04-05 | End: 2019-04-10

## 2019-04-05 RX ORDER — AZITHROMYCIN 200 MG/5ML
POWDER, FOR SUSPENSION ORAL
Qty: 40 ML | Refills: 0 | Status: SHIPPED | OUTPATIENT
Start: 2019-04-05

## 2019-04-08 ENCOUNTER — TELEPHONE (OUTPATIENT)
Dept: FAMILY MEDICINE CLINIC | Facility: CLINIC | Age: 39
End: 2019-04-08

## 2019-04-08 DIAGNOSIS — G12.21 AMYOTROPHIC LATERAL SCLEROSIS (ALS) (HCC): Primary | ICD-10-CM

## 2019-04-08 RX ORDER — IPRATROPIUM BROMIDE AND ALBUTEROL SULFATE 2.5; .5 MG/3ML; MG/3ML
3 SOLUTION RESPIRATORY (INHALATION) EVERY 6 HOURS PRN
Qty: 60 VIAL | Refills: 3 | Status: SHIPPED | OUTPATIENT
Start: 2019-04-08

## 2019-04-20 DIAGNOSIS — R51.9 ACUTE NONINTRACTABLE HEADACHE, UNSPECIFIED HEADACHE TYPE: ICD-10-CM

## 2019-04-21 RX ORDER — RIZATRIPTAN BENZOATE 10 MG/1
TABLET, ORALLY DISINTEGRATING ORAL
Qty: 12 TABLET | Refills: 1 | Status: SHIPPED | OUTPATIENT
Start: 2019-04-21 | End: 2019-06-25 | Stop reason: SDUPTHER

## 2019-06-13 DIAGNOSIS — J45.40 MODERATE PERSISTENT ASTHMA WITHOUT COMPLICATION: ICD-10-CM

## 2019-06-25 DIAGNOSIS — R51.9 ACUTE NONINTRACTABLE HEADACHE, UNSPECIFIED HEADACHE TYPE: ICD-10-CM

## 2019-06-25 RX ORDER — RIZATRIPTAN BENZOATE 10 MG/1
TABLET, ORALLY DISINTEGRATING ORAL
Qty: 12 TABLET | Refills: 1 | Status: SHIPPED | OUTPATIENT
Start: 2019-06-25

## 2019-07-11 DIAGNOSIS — N94.6 DYSMENORRHEA: ICD-10-CM

## 2019-07-11 RX ORDER — NORETHINDRONE ACETATE/ETHINYL ESTRADIOL AND FERROUS FUMARATE 1MG-20(21)
KIT ORAL
Qty: 84 TABLET | Refills: 1 | Status: SHIPPED | OUTPATIENT
Start: 2019-07-11

## 2019-11-22 ENCOUNTER — TELEPHONE (OUTPATIENT)
Dept: FAMILY MEDICINE CLINIC | Facility: CLINIC | Age: 39
End: 2019-11-22

## 2019-11-30 DIAGNOSIS — J45.40 MODERATE PERSISTENT ASTHMA WITHOUT COMPLICATION: ICD-10-CM

## 2019-12-15 DIAGNOSIS — J45.40 MODERATE PERSISTENT ASTHMA WITHOUT COMPLICATION: ICD-10-CM

## 2019-12-27 DIAGNOSIS — B37.2 YEAST DERMATITIS: ICD-10-CM

## 2020-02-04 NOTE — TELEPHONE ENCOUNTER
02/04/20 10:52 AM     Thank you for your request  Your request has been received, reviewed, and the patient chart updated  The PCP has successfully been removed with a patient attribution note  This message will now be completed      Thank you  Dayan Monday

## 2020-05-25 DIAGNOSIS — J45.40 MODERATE PERSISTENT ASTHMA WITHOUT COMPLICATION: ICD-10-CM

## 2020-10-23 DIAGNOSIS — J45.40 MODERATE PERSISTENT ASTHMA WITHOUT COMPLICATION: ICD-10-CM

## 2020-10-24 DIAGNOSIS — J45.40 MODERATE PERSISTENT ASTHMA WITHOUT COMPLICATION: ICD-10-CM

## 2021-02-14 DIAGNOSIS — J45.40 MODERATE PERSISTENT ASTHMA WITHOUT COMPLICATION: ICD-10-CM
